# Patient Record
Sex: FEMALE | Race: WHITE | NOT HISPANIC OR LATINO | Employment: OTHER | URBAN - NONMETROPOLITAN AREA
[De-identification: names, ages, dates, MRNs, and addresses within clinical notes are randomized per-mention and may not be internally consistent; named-entity substitution may affect disease eponyms.]

---

## 2024-07-13 ENCOUNTER — HOSPITAL ENCOUNTER (EMERGENCY)
Facility: HOSPITAL | Age: 69
Discharge: HOME | End: 2024-07-13
Attending: PHYSICIAN ASSISTANT | Admitting: PHYSICIAN ASSISTANT
Payer: COMMERCIAL

## 2024-07-13 VITALS
OXYGEN SATURATION: 95 % | TEMPERATURE: 99.4 F | WEIGHT: 150 LBS | DIASTOLIC BLOOD PRESSURE: 68 MMHG | RESPIRATION RATE: 16 BRPM | HEART RATE: 88 BPM | SYSTOLIC BLOOD PRESSURE: 116 MMHG

## 2024-07-13 DIAGNOSIS — U07.1 COVID-19 VIRUS INFECTION: ICD-10-CM

## 2024-07-13 LAB — SARS-COV-2 RNA RESP QL NAA+PROBE: POSITIVE

## 2024-07-13 PROCEDURE — 99213 OFFICE O/P EST LOW 20 MIN: CPT | Performed by: PHYSICIAN ASSISTANT

## 2024-07-13 PROCEDURE — 87635 SARS-COV-2 COVID-19 AMP PRB: CPT | Performed by: PHYSICIAN ASSISTANT

## 2024-07-13 PROCEDURE — G0463 HOSPITAL OUTPT CLINIC VISIT: HCPCS

## 2024-07-13 ASSESSMENT — ENCOUNTER SYMPTOMS
FATIGUE: 1
HEADACHES: 1
CHILLS: 1
SHORTNESS OF BREATH: 1

## 2024-07-13 ASSESSMENT — ACTIVITIES OF DAILY LIVING (ADL): ADLS_ACUITY_SCORE: 35

## 2024-07-13 NOTE — ED PROVIDER NOTES
History     Chief Complaint   Patient presents with    URI     HPI  Shanelljessie Townsend is a 68 year old female who presents to urgent care with concerns for COVID.  Patient states that 3 days ago she began having cold symptoms consisting of fatigue, chills, mild shortness of breath, headache.  She states that she took an at home COVID test that was  but that it tested positive last night.  Patient would like a confirmation test today for COVID.    Allergies:  Allergies   Allergen Reactions    Dexamethasone Hallucination    Zithromax [Azithromycin] GI Disturbance       Problem List:    There are no problems to display for this patient.       Past Medical History:    No past medical history on file.    Past Surgical History:    No past surgical history on file.    Family History:    No family history on file.    Social History:  Marital Status:  Single [1]        Medications:    nirmatrelvir and ritonavir (PAXLOVID) 300 mg/100 mg therapy pack          Review of Systems   Constitutional:  Positive for chills and fatigue.   Respiratory:  Positive for shortness of breath.    Neurological:  Positive for headaches.   All other systems reviewed and are negative.      Physical Exam   BP: 116/68  Pulse: 88  Temp: 99.4  F (37.4  C)  Resp: 16  Weight: 68 kg (150 lb)  SpO2: 95 %      Physical Exam  Vitals and nursing note reviewed.   Constitutional:       General: She is not in acute distress.     Appearance: Normal appearance. She is not ill-appearing or toxic-appearing.   Eyes:      Pupils: Pupils are equal, round, and reactive to light.   Cardiovascular:      Rate and Rhythm: Regular rhythm.      Heart sounds: Normal heart sounds.   Pulmonary:      Effort: Pulmonary effort is normal. No respiratory distress.      Breath sounds: Normal breath sounds. No stridor. No wheezing, rhonchi or rales.   Neurological:      Mental Status: She is alert and oriented to person, place, and time.         ED Course         Procedures             Critical Care time:               Results for orders placed or performed during the hospital encounter of 07/13/24 (from the past 24 hour(s))   Symptomatic COVID-19 Virus (Coronavirus) by PCR Nose    Specimen: Nose; Swab   Result Value Ref Range    SARS CoV2 PCR Positive (A) Negative    Narrative    Testing was performed using the Xpert Xpress SARS-CoV-2 Assay on the Cepheid Gene-Xpert Instrument Systems. Additional information about this Emergency Use Authorization (EUA) assay can be found via the Lab Guide. This test should be ordered for the detection of SARS-CoV-2 in individuals who meet SARS-CoV-2 clinical and/or epidemiological criteria as well as from individuals without symptoms or other reasons to suspect COVID-19. Test performance for asymptomatic patients has only been established in anterior nasal swab specimens. This test is for in vitro diagnostic use under the FDA EUA for laboratories certified under CLIA to perform high complexity testing. This test has not been FDA cleared or approved. A negative result does not rule out the presence of PCR inhibitors in the specimen or target RNA concentration below the limit of detection for the assay. The possibility of a false negative should be considered if the patient's recent exposure or clinical presentation suggests COVID-19. This test was validated by [facility] laboratory. This laboratory is certified under the Clinical Laboratory Improvement Amendments (CLIA) as qualified to perform high complexity testing.       Medications - No data to display    Assessments & Plan (with Medical Decision Making)     1.  COVID-19    Discussed exam findings as well as positive COVID test today with patient.  Patient would like prescription for Paxlovid.  Discussed benefits, versus possible drug interactions and side effects of taking Paxlovid; patient states that she still would like a prescription of the medication Paxlovid.   Patient is instructed to rest, push fluids, take Tylenol or ibuprofen as directed for pain or fever.  If patient develops any shortness of breath she should go to emergency department immediately.  Any additional concerns patient can return to urgent care or follow-up with primary care provider.  Patient verbalized understanding agreement of plan.    I have reviewed the nursing notes.    I have reviewed the findings, diagnosis, plan and need for follow up with the patient.                There are no discharge medications for this patient.      Final diagnoses:   COVID-19 virus infection       7/13/2024   [facility]       Cash Rosa PA-C  07/13/24 1144

## 2024-07-13 NOTE — ED TRIAGE NOTES
Pt presents with c/o having a positive covid test last night and would like to get on plaxlavid for it   Pt reports increased chills and requesting us to do another test just incase had a false positive   Pt had allergy sinus headache medicine at 1000         Home